# Patient Record
Sex: FEMALE | Race: BLACK OR AFRICAN AMERICAN | NOT HISPANIC OR LATINO | URBAN - METROPOLITAN AREA
[De-identification: names, ages, dates, MRNs, and addresses within clinical notes are randomized per-mention and may not be internally consistent; named-entity substitution may affect disease eponyms.]

---

## 2017-06-06 ENCOUNTER — EMERGENCY (EMERGENCY)
Facility: HOSPITAL | Age: 22
LOS: 1 days | Discharge: HOME | End: 2017-06-06
Attending: EMERGENCY MEDICINE | Admitting: EMERGENCY MEDICINE
Payer: COMMERCIAL

## 2017-06-06 VITALS
HEART RATE: 98 BPM | WEIGHT: 139.99 LBS | RESPIRATION RATE: 20 BRPM | SYSTOLIC BLOOD PRESSURE: 122 MMHG | TEMPERATURE: 98 F | OXYGEN SATURATION: 98 % | DIASTOLIC BLOOD PRESSURE: 84 MMHG | HEIGHT: 69 IN

## 2017-06-06 PROCEDURE — 99283 EMERGENCY DEPT VISIT LOW MDM: CPT | Mod: 25

## 2017-06-06 PROCEDURE — 99283 EMERGENCY DEPT VISIT LOW MDM: CPT

## 2017-06-06 PROCEDURE — 99053 MED SERV 10PM-8AM 24 HR FAC: CPT

## 2017-06-06 RX ORDER — OFLOXACIN 0.3 %
1 DROPS OPHTHALMIC (EYE) ONCE
Qty: 0 | Refills: 0 | Status: COMPLETED | OUTPATIENT
Start: 2017-06-06 | End: 2017-06-06

## 2017-06-06 RX ORDER — KETOROLAC TROMETHAMINE 0.5 %
1 DROPS OPHTHALMIC (EYE) ONCE
Qty: 0 | Refills: 0 | Status: COMPLETED | OUTPATIENT
Start: 2017-06-06 | End: 2017-06-06

## 2017-06-06 RX ADMIN — Medication 1 DROP(S): at 02:50

## 2017-06-06 RX ADMIN — Medication 1 DROP(S): at 03:25

## 2017-06-06 NOTE — ED PROVIDER NOTE - OBJECTIVE STATEMENT
This is a 20 y/o female presenting to the ED with fb sensation to left eye. Pt states she was getting ready to go to bed and felt something in her eye. When she looked in the mirror there appeared to be a white fb on her left pupil. She attempted to remove the fb without success. She notes some blurry vision to this eye. Pt states she was crying so much she thins the fb came out, however she still admits to pain. She denies photophobia, nausea, vomiting, discharges, pruritis. She is up to date on her vaccinations. Pt does not wear contact lenses or have hx of any eye surgeries.

## 2017-06-06 NOTE — ED PROVIDER NOTE - MEDICAL DECISION MAKING DETAILS
Corneal Abrasion Left Eye: Ketorolac. Ocuflox. F/u with eye doctor within 48 hours for re-check Corneal Abrasion Left Eye: NO FB on exam. Pt states FB came out prior to exam. Ketorolac. Ocuflox. F/u with eye doctor within 48 hours for re-check

## 2017-06-06 NOTE — ED ADULT TRIAGE NOTE - CHIEF COMPLAINT QUOTE
Left eye pain starting 1 hour ago, patient denies injuries Left eye pain starting 1 hour ago, patient denies injuries, feels foreign object.